# Patient Record
Sex: FEMALE | Race: WHITE | NOT HISPANIC OR LATINO | Employment: OTHER | URBAN - METROPOLITAN AREA
[De-identification: names, ages, dates, MRNs, and addresses within clinical notes are randomized per-mention and may not be internally consistent; named-entity substitution may affect disease eponyms.]

---

## 2018-10-08 NOTE — PATIENT DISCUSSION
VERY SYMPTOMATIC, SEEING DR HICKS, MAY NEED RX. ON MAXIMUM TREATMENT (GEL AT NIGHT, DROPS DURING THE DAY. ).

## 2020-01-31 ENCOUNTER — NEW PATIENT COMPREHENSIVE (OUTPATIENT)
Dept: URBAN - METROPOLITAN AREA CLINIC 47 | Facility: CLINIC | Age: 65
End: 2020-01-31

## 2020-01-31 DIAGNOSIS — H04.123: ICD-10-CM

## 2020-01-31 DIAGNOSIS — Z96.1: ICD-10-CM

## 2020-01-31 DIAGNOSIS — H40.013: ICD-10-CM

## 2020-01-31 DIAGNOSIS — H26.492: ICD-10-CM

## 2020-01-31 PROCEDURE — 92004 COMPRE OPH EXAM NEW PT 1/>: CPT

## 2020-01-31 ASSESSMENT — VISUAL ACUITY
OD_SC: 20/30
OU_SC: J3
OS_SC: 20/40
OD_SC: J12
OS_SC: J6
OU_SC: 20/25-2
OS_BAT: 20/70

## 2020-01-31 ASSESSMENT — TONOMETRY
OD_IOP_MMHG: 16
OS_IOP_MMHG: 16

## 2020-02-18 ENCOUNTER — YAG EVALUATION (OUTPATIENT)
Dept: URBAN - METROPOLITAN AREA CLINIC 39 | Facility: CLINIC | Age: 65
End: 2020-02-18

## 2020-02-18 ENCOUNTER — SURGERY/PROCEDURE (OUTPATIENT)
Dept: URBAN - METROPOLITAN AREA SURGERY 14 | Facility: SURGERY | Age: 65
End: 2020-02-18

## 2020-02-18 DIAGNOSIS — H02.833: ICD-10-CM

## 2020-02-18 DIAGNOSIS — H26.492: ICD-10-CM

## 2020-02-18 DIAGNOSIS — H40.013: ICD-10-CM

## 2020-02-18 DIAGNOSIS — H02.836: ICD-10-CM

## 2020-02-18 DIAGNOSIS — H04.123: ICD-10-CM

## 2020-02-18 PROCEDURE — 92014 COMPRE OPH EXAM EST PT 1/>: CPT

## 2020-02-18 PROCEDURE — 66821 AFTER CATARACT LASER SURGERY: CPT

## 2020-02-18 ASSESSMENT — TONOMETRY
OD_IOP_MMHG: 16
OS_IOP_MMHG: 16

## 2020-02-18 ASSESSMENT — VISUAL ACUITY
OS_BAT: 20/70
OS_SC: 20/30-2
OD_SC: 20/30

## 2020-02-24 ENCOUNTER — CONSULT (OUTPATIENT)
Dept: URBAN - METROPOLITAN AREA CLINIC 39 | Facility: CLINIC | Age: 65
End: 2020-02-24

## 2020-02-24 DIAGNOSIS — H02.834: ICD-10-CM

## 2020-02-24 DIAGNOSIS — H02.835: ICD-10-CM

## 2020-02-24 DIAGNOSIS — H02.832: ICD-10-CM

## 2020-02-24 DIAGNOSIS — H02.831: ICD-10-CM

## 2020-02-24 PROCEDURE — 92285 EXTERNAL OCULAR PHOTOGRAPHY: CPT

## 2020-02-24 PROCEDURE — 99213 OFFICE O/P EST LOW 20 MIN: CPT

## 2020-02-24 ASSESSMENT — VISUAL ACUITY
OD_SC: 20/30-1
OS_SC: 20/30

## 2020-02-27 ENCOUNTER — YAG POST-OP (OUTPATIENT)
Dept: URBAN - METROPOLITAN AREA CLINIC 39 | Facility: CLINIC | Age: 65
End: 2020-02-27

## 2020-02-27 DIAGNOSIS — Z98.890: ICD-10-CM

## 2020-02-27 PROCEDURE — 99024 POSTOP FOLLOW-UP VISIT: CPT

## 2020-02-27 ASSESSMENT — VISUAL ACUITY
OD_SC: 20/30-1
OD_SC: J8
OS_SC: J1
OS_SC: 20/25-1
OU_SC: 20/20-1

## 2020-02-27 ASSESSMENT — TONOMETRY
OD_IOP_MMHG: 16
OS_IOP_MMHG: 16

## 2020-03-03 ENCOUNTER — VISUAL FIELD ONLY (OUTPATIENT)
Dept: URBAN - METROPOLITAN AREA CLINIC 39 | Facility: CLINIC | Age: 65
End: 2020-03-03

## 2020-03-03 DIAGNOSIS — H02.831: ICD-10-CM

## 2020-03-03 DIAGNOSIS — H02.834: ICD-10-CM

## 2020-03-03 PROCEDURE — 92082 INTERMEDIATE VISUAL FIELD XM: CPT

## 2020-03-03 PROCEDURE — 99211T TECH SERVICE

## 2020-03-16 ENCOUNTER — PRE-OP/H&P (OUTPATIENT)
Dept: URBAN - METROPOLITAN AREA CLINIC 39 | Facility: CLINIC | Age: 65
End: 2020-03-16

## 2020-03-16 DIAGNOSIS — H02.834: ICD-10-CM

## 2020-03-16 DIAGNOSIS — H02.832: ICD-10-CM

## 2020-03-16 DIAGNOSIS — H02.831: ICD-10-CM

## 2020-03-16 DIAGNOSIS — H02.835: ICD-10-CM

## 2020-03-16 PROCEDURE — 99211HP H&P OFFICE/OUTPATIENT VISIT, EST

## 2020-03-16 RX ORDER — TRAMADOL HCL 50 MG/1
1 TABLET ORAL
Start: 2020-04-06

## 2020-03-16 RX ORDER — ERYTHROMYCIN 5 MG/G
OINTMENT OPHTHALMIC
Start: 2020-04-06

## 2021-02-24 ENCOUNTER — TECH ONLY (OUTPATIENT)
Dept: URBAN - METROPOLITAN AREA CLINIC 39 | Facility: CLINIC | Age: 66
End: 2021-02-24

## 2021-02-24 ENCOUNTER — CONSULT (OUTPATIENT)
Dept: URBAN - METROPOLITAN AREA CLINIC 39 | Facility: CLINIC | Age: 66
End: 2021-02-24

## 2021-02-24 DIAGNOSIS — H02.832: ICD-10-CM

## 2021-02-24 DIAGNOSIS — H02.834: ICD-10-CM

## 2021-02-24 DIAGNOSIS — H02.835: ICD-10-CM

## 2021-02-24 DIAGNOSIS — H40.013: ICD-10-CM

## 2021-02-24 DIAGNOSIS — L98.8: ICD-10-CM

## 2021-02-24 DIAGNOSIS — H26.492: ICD-10-CM

## 2021-02-24 DIAGNOSIS — H02.831: ICD-10-CM

## 2021-02-24 PROCEDURE — 99213 OFFICE O/P EST LOW 20 MIN: CPT

## 2021-02-24 PROCEDURE — 92285 EXTERNAL OCULAR PHOTOGRAPHY: CPT

## 2021-02-24 PROCEDURE — 99211T TECH SERVICE

## 2021-02-24 PROCEDURE — 92082 INTERMEDIATE VISUAL FIELD XM: CPT

## 2021-02-24 ASSESSMENT — VISUAL ACUITY
OD_SC: 20/40-1
OS_SC: 20/40

## 2021-03-19 ENCOUNTER — ESTABLISHED COMPREHENSIVE EXAM (OUTPATIENT)
Dept: URBAN - METROPOLITAN AREA CLINIC 47 | Facility: CLINIC | Age: 66
End: 2021-03-19

## 2021-03-19 DIAGNOSIS — H40.013: ICD-10-CM

## 2021-03-19 DIAGNOSIS — H02.831: ICD-10-CM

## 2021-03-19 DIAGNOSIS — H04.123: ICD-10-CM

## 2021-03-19 PROCEDURE — A4262 TEMPORARY TEAR DUCT PLUG: HCPCS

## 2021-03-19 PROCEDURE — 92015 DETERMINE REFRACTIVE STATE: CPT

## 2021-03-19 PROCEDURE — 92014 COMPRE OPH EXAM EST PT 1/>: CPT

## 2021-03-19 PROCEDURE — 68761Q PUNCTAL PLUG/QUINTESS DISSOLVABLE PLUG/EACH

## 2021-03-19 ASSESSMENT — TONOMETRY
OD_IOP_MMHG: 12
OS_IOP_MMHG: 12

## 2021-03-19 ASSESSMENT — VISUAL ACUITY
OS_SC: J2
OD_SC: J12
OS_SC: 20/30-1
OD_SC: 20/40-2

## 2021-04-05 ENCOUNTER — PRE-OP/H&P (OUTPATIENT)
Dept: URBAN - METROPOLITAN AREA CLINIC 39 | Facility: CLINIC | Age: 66
End: 2021-04-05

## 2021-04-05 DIAGNOSIS — H02.832: ICD-10-CM

## 2021-04-05 DIAGNOSIS — H02.835: ICD-10-CM

## 2021-04-05 DIAGNOSIS — H02.831: ICD-10-CM

## 2021-04-05 DIAGNOSIS — H40.013: ICD-10-CM

## 2021-04-05 DIAGNOSIS — H26.492: ICD-10-CM

## 2021-04-05 DIAGNOSIS — H02.834: ICD-10-CM

## 2021-04-05 DIAGNOSIS — H04.123: ICD-10-CM

## 2021-04-05 DIAGNOSIS — L98.8: ICD-10-CM

## 2021-04-05 PROCEDURE — 99211HP H&P OFFICE/OUTPATIENT VISIT, EST

## 2021-04-05 RX ORDER — ERYTHROMYCIN 5 MG/G
OINTMENT OPHTHALMIC
Start: 2021-04-26

## 2021-04-05 RX ORDER — TRAMADOL HCL 50 MG/1
1 TABLET ORAL
Start: 2021-04-26

## 2021-04-26 ENCOUNTER — PRE-OP/H&P (OUTPATIENT)
Dept: URBAN - METROPOLITAN AREA SURGERY 14 | Facility: SURGERY | Age: 66
End: 2021-04-26

## 2021-04-26 ENCOUNTER — SURGERY/PROCEDURE (OUTPATIENT)
Dept: URBAN - METROPOLITAN AREA SURGERY 14 | Facility: SURGERY | Age: 66
End: 2021-04-26

## 2021-04-26 DIAGNOSIS — H02.834: ICD-10-CM

## 2021-04-26 DIAGNOSIS — H02.831: ICD-10-CM

## 2021-04-26 PROCEDURE — 99499 UNLISTED E&M SERVICE: CPT

## 2021-04-26 PROCEDURE — 1582350 UPPER BLEPH PER EYE FUNCTIONAL-BILATERAL

## 2021-05-05 ENCOUNTER — TECH ONLY (OUTPATIENT)
Dept: URBAN - METROPOLITAN AREA CLINIC 39 | Facility: CLINIC | Age: 66
End: 2021-05-05

## 2021-05-05 ENCOUNTER — SURGERY/PROCEDURE (OUTPATIENT)
Dept: URBAN - METROPOLITAN AREA CLINIC 39 | Facility: CLINIC | Age: 66
End: 2021-05-05

## 2021-05-05 DIAGNOSIS — H40.013: ICD-10-CM

## 2021-05-05 DIAGNOSIS — H02.831: ICD-10-CM

## 2021-05-05 DIAGNOSIS — H02.835: ICD-10-CM

## 2021-05-05 DIAGNOSIS — Z98.890: ICD-10-CM

## 2021-05-05 DIAGNOSIS — H04.123: ICD-10-CM

## 2021-05-05 DIAGNOSIS — H26.492: ICD-10-CM

## 2021-05-05 DIAGNOSIS — H02.832: ICD-10-CM

## 2021-05-05 DIAGNOSIS — L98.8: ICD-10-CM

## 2021-05-05 DIAGNOSIS — H02.834: ICD-10-CM

## 2021-05-05 PROCEDURE — 99211T TECH SERVICE

## 2021-05-05 ASSESSMENT — VISUAL ACUITY
OD_SC: 20/40-2
OS_SC: 20/50+2

## 2024-02-27 ENCOUNTER — COMPREHENSIVE EXAM (OUTPATIENT)
Dept: URBAN - METROPOLITAN AREA CLINIC 46 | Facility: CLINIC | Age: 69
End: 2024-02-27

## 2024-02-27 DIAGNOSIS — H52.7: ICD-10-CM

## 2024-02-27 DIAGNOSIS — H40.013: ICD-10-CM

## 2024-02-27 DIAGNOSIS — H04.123: ICD-10-CM

## 2024-02-27 DIAGNOSIS — Z96.1: ICD-10-CM

## 2024-02-27 PROCEDURE — 92014 COMPRE OPH EXAM EST PT 1/>: CPT

## 2024-02-27 PROCEDURE — 92015 DETERMINE REFRACTIVE STATE: CPT

## 2024-02-27 ASSESSMENT — VISUAL ACUITY
OS_SC: 20/20
OS_SC: J3
OD_SC: 20/20
OD_SC: J10

## 2024-02-27 ASSESSMENT — TONOMETRY
OD_IOP_MMHG: 15
OS_IOP_MMHG: 16